# Patient Record
Sex: FEMALE | Race: WHITE | NOT HISPANIC OR LATINO | URBAN - METROPOLITAN AREA
[De-identification: names, ages, dates, MRNs, and addresses within clinical notes are randomized per-mention and may not be internally consistent; named-entity substitution may affect disease eponyms.]

---

## 2023-12-07 ENCOUNTER — OFFICE (OUTPATIENT)
Dept: URBAN - METROPOLITAN AREA CLINIC 29 | Facility: CLINIC | Age: 89
Setting detail: OPHTHALMOLOGY
End: 2023-12-07
Payer: MEDICARE

## 2023-12-07 DIAGNOSIS — H35.013: ICD-10-CM

## 2023-12-07 DIAGNOSIS — Z96.1: ICD-10-CM

## 2023-12-07 DIAGNOSIS — H40.003: ICD-10-CM

## 2023-12-07 PROBLEM — H35.40 PERIPAPILLARY ATROPHY: Status: ACTIVE | Noted: 2023-12-07

## 2023-12-07 PROCEDURE — 92250 FUNDUS PHOTOGRAPHY W/I&R: CPT | Performed by: OPHTHALMOLOGY

## 2023-12-07 PROCEDURE — 92014 COMPRE OPH EXAM EST PT 1/>: CPT | Performed by: OPHTHALMOLOGY

## 2023-12-07 ASSESSMENT — CONFRONTATIONAL VISUAL FIELD TEST (CVF)
OD_FINDINGS: FULL
OS_FINDINGS: FULL

## 2023-12-07 ASSESSMENT — REFRACTION_AUTOREFRACTION
OS_SPHERE: -1.00
OS_AXIS: 150
OD_SPHERE: -0.50
OS_CYLINDER: +1.00
OD_CYLINDER: +0.25
OD_AXIS: 090

## 2023-12-07 ASSESSMENT — REFRACTION_CURRENTRX
OD_OVR_VA: 20/
OD_SPHERE: +2.00
OS_SPHERE: +2.00
OS_OVR_VA: 20/

## 2023-12-07 ASSESSMENT — SPHEQUIV_DERIVED
OD_SPHEQUIV: -0.375
OS_SPHEQUIV: -0.5

## 2024-01-09 ENCOUNTER — TRANSCRIPTION ENCOUNTER (OUTPATIENT)
Age: 89
End: 2024-01-09

## 2024-01-22 ENCOUNTER — TRANSCRIPTION ENCOUNTER (OUTPATIENT)
Age: 89
End: 2024-01-22

## 2024-03-15 ENCOUNTER — APPOINTMENT (OUTPATIENT)
Dept: PAIN MANAGEMENT | Facility: CLINIC | Age: 89
End: 2024-03-15
Payer: MEDICARE

## 2024-03-15 VITALS — HEART RATE: 69 BPM | DIASTOLIC BLOOD PRESSURE: 76 MMHG | OXYGEN SATURATION: 97 % | SYSTOLIC BLOOD PRESSURE: 129 MMHG

## 2024-03-15 PROBLEM — Z00.00 ENCOUNTER FOR PREVENTIVE HEALTH EXAMINATION: Status: ACTIVE | Noted: 2024-03-15

## 2024-03-15 PROCEDURE — 99204 OFFICE O/P NEW MOD 45 MIN: CPT

## 2024-03-15 PROCEDURE — G2211 COMPLEX E/M VISIT ADD ON: CPT

## 2024-03-15 RX ORDER — PREGABALIN 50 MG/1
50 CAPSULE ORAL
Refills: 0 | Status: ACTIVE | COMMUNITY

## 2024-03-15 RX ORDER — LIDOCAINE AND PRILOCAINE 25; 25 MG/G; MG/G
2.5-2.5 CREAM TOPICAL
Qty: 1 | Refills: 2 | Status: ACTIVE | COMMUNITY
Start: 2024-03-15 | End: 1900-01-01

## 2024-03-15 RX ORDER — TRAMADOL HYDROCHLORIDE 25 MG/1
TABLET, COATED ORAL
Refills: 0 | Status: ACTIVE | COMMUNITY

## 2024-03-15 NOTE — HISTORY OF PRESENT ILLNESS
[FreeTextEntry1] : HPI  Ms. DIDI HAQ is a 89 year F with pmhx of T11 kyphoplasty (2015), shingles to right back (Dec- resolved), recent hospitalization for hyponatremia, chronic lower back pain. Pain to right flank where shingles were present. In addition, reports pain to lower back that radiates anteriorly to left flank. Requires 1/2 Tramadol prn when pain is severe. Pain is severe and impacting her quality of life. Denies any additional weakness, numbness, bowel/bladder dysfunction, history of bleeding disorders.   Previous and current pain medications/doses/effects: Lyrica 50mg qhs, 1/2 tab Tramadol   Previous Pain Treatments: PT/OT (Feb- March)  Previous Pain Injections: None  Previous Diagnostic Studies/Images:  12/29/23 CT THORACIC SPINE  ORDER #: [id number] CC: ; ; ; End of cc's  INTERPRETATION: PROCEDURE: CT thoracic spine without contrast.  INDICATION: Right back pain, history of thoracic kyphoplasty  TECHNIQUE: CT thoracic spine: Multiple axial sections were obtained from the cervicothoracic to the thoracolumbar junction with coronal and sagittal reformatted images.   COMPARISON: Thoracic spine radiographs 4/8/2015  FINDINGS:  There is a stable moderate T10 compression fracture. There is a T11 compression fracture cement material consistent with history of kyphoplasty. Otherwise the vertebral body heights are maintained. There is multilevel disc space narrowing with vacuum disc phenomenon at T9-T10. The prevertebral and paravertebral soft tissues are unremarkable. There is no high-grade spinal canal stenosis.  IMPRESSION:  No acute fracture or malalignment. Stable T10 and T11 compression fractures compared to radiographs 4/8/2015.  ====================================================================================== 12/31/23 CT LUMBAR SPINE  ORDER #: [id number] CC: ; ; ; End of cc's  .  CLINICAL INFORMATION: Lumbar spinal pain.  TECHNIQUE: Transaxial CT images of the lumbar spine was obtained without IV contrast. Axial, sagittal and coronal reformatted images were also reviewed.  COMPARISON: Prior CT study of the abdomen and pelvis dated 12/29/2023.  FINDINGS: Again seen is that the patient is status post vertebroplasty at the T11 level. A mild anterior wedge chronic compression deformity remains.  There are no acute fractures or dislocations. There is unchanged grade 1 anterolisthesis of L4 and S1 without pars defects. There is no loss of vertebral body height. No aggressive osteoblastic or osteolytic lesions are seen. Scattered degenerative lucencies and areas of sclerosis are seen throughout the vertebral bodies and facets. Degenerative disc disease is seen at every disc space level along with vacuum disc phenomenon. Lower lumbar facet arthropathy is appreciated. There is squaring and hypertrophy of the lumbar spinous processes which approximate each other. 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  Evaluation of the intraspinal contents is limited on CT modality. Variable degrees of multilevel canal and foraminal narrowing are noted secondary to bulging discs, listhesis, and other degenerative changes.  Bilateral SI joint arthropathy is notable.  Arterial vascular calcifications are seen. Previously noted uterine lipoleiomyoma appears unchanged.  IMPRESSION: No acute fractures or dislocations.  Multilevel lumbar spondylosis which appears similar.  [Back Pain] : back pain [10] : a maximum pain level of 10/10 [8] : an average pain level of 8/10 [Throbbing] : throbbing [Burning] : burning [Bending] : bending [Sitting] : sitting

## 2024-03-15 NOTE — ASSESSMENT
[FreeTextEntry1] : >> Imaging and Other Studies   back and Thoracic pain likely secondary to Thoracolumbar radiculopathy and discogenic pain refractory to conservative treatments including 6 consecutive weeks of home exercises/PT, will obtain MRI TS& LS to evaluate for pathology  may consider PT vs intervention pending eval   >> Therapy and Other Modalities   consider PT  >> Medications   continue lyrica  Regarding opiate medication to manage pain. I had a detailed discussion with the patient regarding the risks of long-term opioid use, including the potential for medication side effects, hyperaglesia, endocrine dysfunction,    acetaminophen 650mg q8h prn pain (caution <3g daily)  trial emla  trial capsaicin  >> Interventions   n/a  >> Consults   continue care with Dr. Austin  >> Discussion of Risks/Benefits/Alternatives    >Regarding any scheduled procedures:   In the event the patient is scheduled for a procedure,   I have discussed in detail with the patient that any interventional pain procedure is associated with potential risks.  The procedure may include an injection of steroids and potentially other medications (local anesthetic and normal saline) into the epidural space or surrounding tissue of the spine.  There are significant risks of this procedure which include and are not limited to infection, bleeding, worsening pain, dural puncture leading to postdural puncture headache, nerve damage, spinal cord injury, paralysis, stroke, and death.   There is a chance that the procedure does not improve their pain.   There are risks associated with the steroid being absorbed into the body systemically.  These include dysphoria, difficulty sleeping, mood swings and personality changes.  Premenopausal women may notice an irregularity in her menstrual cycle for 2-3 months following the injection.  Steroids can specifically affect patients with hypertension, diabetes, and peptic ulcers.  The procedure may cause a temporary increase in blood pressure and blood pressure, and may adversely affect a peptic ulcer.  Other, more rare complications, include avascular necrosis of joints, glaucoma and worsening of osteoporosis.   I have discussed the risks of the procedure at length with the patient, and the potential benefits of pain relief.  I have offered alternatives to the procedure.  All questions were answered.   The patient expressed understanding and wishes to proceed with the procedure.   > Longitudinal management of Complex Painful condition   The patient is being managed for a complex condition that requires ongoing management.  The nature of this condition demands nuanced approach to treatment.  The seriousness of the condition necessitates an in-depth and focused approach to management and coordination with other healthcare professionals.    This visit involves intricate evaluation and management of the patient's condition.  The complexity of the visit was due to the need for detailed assessment of the current state, consideration of potential complications and a careful balancing of treatment options to management the chronic condition effectively.   As detailed above, the patient has a chronic significant painful condition that requires regular and detailed management.  The condition's impact on the patient's quality of life and health is substantial and necessitates a comprehensive and tailored approach   >> Conclusion   There were no barriers to communication. Informed patient that I would be available for any additional questions. Patient was instructed to call with any worsening symptoms including severe pain, new numbness/weakness, or changes in the bowel/bladder function. Discussed role of nsaids in pain management and all relevant risks, if patient is continuing to require after 4 weeks the patient should f/u for alternative treatment. Instructed patient to maintain pain diary to monitor pain level, mobility, and function.

## 2024-03-21 ENCOUNTER — RESULT REVIEW (OUTPATIENT)
Age: 89
End: 2024-03-21

## 2024-03-22 ENCOUNTER — APPOINTMENT (OUTPATIENT)
Dept: PAIN MANAGEMENT | Facility: CLINIC | Age: 89
End: 2024-03-22
Payer: MEDICARE

## 2024-03-22 VITALS
BODY MASS INDEX: 31.49 KG/M2 | WEIGHT: 150 LBS | SYSTOLIC BLOOD PRESSURE: 120 MMHG | HEIGHT: 58 IN | DIASTOLIC BLOOD PRESSURE: 60 MMHG

## 2024-03-22 PROCEDURE — G2211 COMPLEX E/M VISIT ADD ON: CPT

## 2024-03-22 PROCEDURE — 99214 OFFICE O/P EST MOD 30 MIN: CPT

## 2024-03-22 NOTE — PHYSICAL EXAM
[Normal muscle bulk without asymmetry] : normal muscle bulk without asymmetry [Spine: Flexion to ___ degrees, without pain] : spine: flexion to [unfilled] degrees, without pain [Facet Tenderness] : facet tenderness [] : Motor: [Normal] : Normal affect

## 2024-04-10 ENCOUNTER — APPOINTMENT (OUTPATIENT)
Dept: PAIN MANAGEMENT | Facility: CLINIC | Age: 89
End: 2024-04-10
Payer: MEDICARE

## 2024-04-10 VITALS
HEART RATE: 80 BPM | RESPIRATION RATE: 16 BRPM | SYSTOLIC BLOOD PRESSURE: 120 MMHG | OXYGEN SATURATION: 98 % | DIASTOLIC BLOOD PRESSURE: 76 MMHG

## 2024-04-10 PROCEDURE — 64494 INJ PARAVERT F JNT L/S 2 LEV: CPT | Mod: RT

## 2024-04-10 PROCEDURE — 64493 INJ PARAVERT F JNT L/S 1 LEV: CPT | Mod: RT

## 2024-04-10 RX ADMIN — Medication 0 %: at 00:00

## 2024-04-10 RX ADMIN — TRIAMCINOLONE ACETONIDE 0 MG/ML: 10 INJECTION, SUSPENSION INTRA-ARTICULAR; INTRALESIONAL at 00:00

## 2024-04-10 RX ADMIN — LIDOCAINE HYDROCHLORIDE %: 10 INJECTION, SOLUTION INFILTRATION; PERINEURAL at 00:00

## 2024-04-10 NOTE — HISTORY OF PRESENT ILLNESS
[Back Pain] : back pain [10] : a maximum pain level of 10/10 [8] : an average pain level of 8/10 [Throbbing] : throbbing [Burning] : burning [Bending] : bending [Sitting] : sitting [FreeTextEntry1] : Interval Note:  Since last visit the pain is not improved.  Continues to have pain over right flank.  Pain is so bad that patient finds it difficult to perform adls and ambulate. Denies any additional weakness, numbness, bowel/bladder dysfunction.    HPI  Ms. DIDI HAQ is a 89 year F with pmhx of T11 kyphoplasty (2015), shingles to right back (Dec- resolved), recent hospitalization for hyponatremia, chronic lower back pain. Pain to right flank where shingles were present. In addition, reports pain to lower back that radiates anteriorly to left flank. Requires 1/2 Tramadol prn when pain is severe. Pain is severe and impacting her quality of life. Denies any additional weakness, numbness, bowel/bladder dysfunction, history of bleeding disorders.   Previous and current pain medications/doses/effects: Lyrica 50mg qhs, 1/2 tab Tramadol   Previous Pain Treatments: PT/OT (Feb- March)  Previous Pain Injections: None  Previous Diagnostic Studies/Images:   MRI TS LS 3/21/24  Thoracic CT dated 12/29/2023 and lumbar CT dated 12/31/2023. No prior MRI available.   FINDINGS:   OSSEOUS: Mild exaggeration the normal thoracic kyphosis with chronic healed mild anterior wedge compression fracture of the inferior endplate of T10. Unchanged mild anterior wedge compression fracture of the inferior endplate of T11, status post kyphoplasty with nonspecific mild patchy surrounding osteitis.   Mixed Modic lumbar endplate changes. Grade 1 anterolisthesis at L5-S1 without pars interarticularis defects. Grade 1 retrolisthesis at L1-L2.   THORACIC SPINE: Spinal canal and bilateral neural canals are adequate throughout.   LUMBAR SPINE:   T12-L1: Annular disc osteophyte complex contributes to mild spinal canal stenosis and mild left- sided neural canal stenosis. Right neural canal is adequate.   L1-L2: Broad-based posterior disc osteophyte complex contribute to mild to moderate spinal canal stenosis with right side slightly worse than left partial effacement of lateral recesses and mild bilateral neural canal stenosis.   L2-L3: Broad-based posterior disc osteophyte complex contributes to mild spinal canal stenosis and mild bilateral neural canal stenosis.   L3-L4: Broad-based posterior disc osteophyte complex contributes to mild spinal canal stenosis and mild bilateral neural canal stenosis.   L4-L5: Broad-based posterior disc osteophyte complex and mild to moderate facet arthrosis contribute to mild spinal canal stenosis with partial effacement of the right lateral recess and mild to moderate bilateral neural canal stenosis.   L5-S1: Broad-based posterior disc signal bulge and severe facet arthrosis. Spinal canal and bilateral neural canals are adequate.   GENERAL: Conus medullaris tip is anatomic in positioning. No intradural or extradural lesion.   IMPRESSION:  1.  No high-grade thoracolumbar spinal canal or neural canal stenosis.  2.  Unchanged chronic mild anterior wedge compression fractures of the inferior endplates of T10 and T11, status post kyphoplasty at T11.  3.  Overall moderate discogenic lumbar spondylosis.  4.  At L5-S1 degenerative grade 1 anterolisthesis and severe bilateral facet arthrosis.  12/29/23 CT THORACIC SPINE  ORDER #: [id number] CC: ; ; ; End of cc's  INTERPRETATION: PROCEDURE: CT thoracic spine without contrast.  INDICATION: Right back pain, history of thoracic kyphoplasty  TECHNIQUE: CT thoracic spine: Multiple axial sections were obtained from the cervicothoracic to the thoracolumbar junction with coronal and sagittal reformatted images.   COMPARISON: Thoracic spine radiographs 4/8/2015  FINDINGS:  There is a stable moderate T10 compression fracture. There is a T11 compression fracture cement material consistent with history of kyphoplasty. Otherwise the vertebral body heights are maintained. There is multilevel disc space narrowing with vacuum disc phenomenon at T9-T10. The prevertebral and paravertebral soft tissues are unremarkable. There is no high-grade spinal canal stenosis.  IMPRESSION:  No acute fracture or malalignment. Stable T10 and T11 compression fractures compared to radiographs 4/8/2015.  ====================================================================================== 12/31/23 CT LUMBAR SPINE  ORDER #: [id number] CC: ; ; ; End of cc's  .  CLINICAL INFORMATION: Lumbar spinal pain.  TECHNIQUE: Transaxial CT images of the lumbar spine was obtained without IV contrast. Axial, sagittal and coronal reformatted images were also reviewed.  COMPARISON: Prior CT study of the abdomen and pelvis dated 12/29/2023.  FINDINGS: Again seen is that the patient is status post vertebroplasty at the T11 level. A mild anterior wedge chronic compression deformity remains.  There are no acute fractures or dislocations. There is unchanged grade 1 anterolisthesis of L4 and S1 without pars defects. There is no loss of vertebral body height. No aggressive osteoblastic or osteolytic lesions are seen. Scattered degenerative lucencies and areas of sclerosis are seen throughout the vertebral bodies and facets. Degenerative disc disease is seen at every disc space level along with vacuum disc phenomenon. Lower lumbar facet arthropathy is appreciated. There is squaring and hypertrophy of the lumbar spinous processes which approximate each other. 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  Evaluation of the intraspinal contents is limited on CT modality. Variable degrees of multilevel canal and foraminal narrowing are noted secondary to bulging discs, listhesis, and other degenerative changes.  Bilateral SI joint arthropathy is notable.  Arterial vascular calcifications are seen. Previously noted uterine lipoleiomyoma appears unchanged.  IMPRESSION: No acute fractures or dislocations.  Multilevel lumbar spondylosis which appears similar.

## 2024-04-10 NOTE — ASSESSMENT
[FreeTextEntry1] : >> Imaging and Other Studies   I personally reviewed the relevant imaging.  Discussed and explained to patient the likely source of pathology and pain.  Questions answered. MRI    >> Therapy and Other Modalities   consider PT  >> Medications   continue lyrica  Regarding opiate medication to manage pain. I had a detailed discussion with the patient regarding the risks of long-term opioid use, including the potential for medication side effects, hyperaglesia, endocrine dysfunction,    acetaminophen 650mg q8h prn pain (caution <3g daily)  trial emla  trial capsaicin  >> Interventions   Significant component of axial back pain secondary to lumbar spondylosis and facet arthropathy demonstrated on MRI LS.  Pain refractory to conservative treatments.  Will schedule RIGHT lumbar 3, 4, 5 medial branch block (2 joints, 3 nerves on each side) r/b/a discussed  May consider radiofreqency ablation  >> Consults   continue care with Dr. Austin  >> Discussion of Risks/Benefits/Alternatives    >Regarding any scheduled procedures:   In the event the patient is scheduled for a procedure,   I have discussed in detail with the patient that any interventional pain procedure is associated with potential risks.  The procedure may include an injection of steroids and potentially other medications (local anesthetic and normal saline) into the epidural space or surrounding tissue of the spine.  There are significant risks of this procedure which include and are not limited to infection, bleeding, worsening pain, dural puncture leading to postdural puncture headache, nerve damage, spinal cord injury, paralysis, stroke, and death.   There is a chance that the procedure does not improve their pain.   There are risks associated with the steroid being absorbed into the body systemically.  These include dysphoria, difficulty sleeping, mood swings and personality changes.  Premenopausal women may notice an irregularity in her menstrual cycle for 2-3 months following the injection.  Steroids can specifically affect patients with hypertension, diabetes, and peptic ulcers.  The procedure may cause a temporary increase in blood pressure and blood pressure, and may adversely affect a peptic ulcer.  Other, more rare complications, include avascular necrosis of joints, glaucoma and worsening of osteoporosis.   I have discussed the risks of the procedure at length with the patient, and the potential benefits of pain relief.  I have offered alternatives to the procedure.  All questions were answered.   The patient expressed understanding and wishes to proceed with the procedure.   > Longitudinal management of Complex Painful condition   The patient is being managed for a complex condition that requires ongoing management.  The nature of this condition demands nuanced approach to treatment.  The seriousness of the condition necessitates an in-depth and focused approach to management and coordination with other healthcare professionals.    This visit involves intricate evaluation and management of the patient's condition.  The complexity of the visit was due to the need for detailed assessment of the current state, consideration of potential complications and a careful balancing of treatment options to management the chronic condition effectively.   As detailed above, the patient has a chronic significant painful condition that requires regular and detailed management.  The condition's impact on the patient's quality of life and health is substantial and necessitates a comprehensive and tailored approach   >> Conclusion   There were no barriers to communication. Informed patient that I would be available for any additional questions. Patient was instructed to call with any worsening symptoms including severe pain, new numbness/weakness, or changes in the bowel/bladder function. Discussed role of nsaids in pain management and all relevant risks, if patient is continuing to require after 4 weeks the patient should f/u for alternative treatment. Instructed patient to maintain pain diary to monitor pain level, mobility, and function.

## 2024-04-10 NOTE — PROCEDURE
[FreeTextEntry1] : LUMBAR MEDIAL BRANCH BLOCK RIGHT  The patient was placed in the prone position on the fluoroscopic table with a pillow under the abdomen.  Routine monitors were applied.  The back was draped in the usual sterile fashion and sterile technique was adhered to throughout the entire procedure.  The [RIGHT ] L3, 4, 5 levels were identified under fluoroscopic guidance.  The vertebral body end plates were aligned and the junction of the superior articular process and the base of the transverse process was brought into view using an oblique angulation of the C-arm.  The skin and subcutaneous tissues were infiltrated with 1% Lidocaine.  At all the levels except for the lumbosacral junction, a 25-gauge 3.5" spinal needle was inserted at the angle between the superior articular process and the base of the transverse process (after local anesthesia).  Oblique angulation was used to guide the needle into position.  1cc of 0.75% Bupivacaine with 1mg kenalog was injected at each level.    The patient tolerated the procedure well.  There was no neurological deficit post procedure.  The patient went to recovery room in stable condition.  Discharge instructions were given to the patient.

## 2024-04-11 ENCOUNTER — NON-APPOINTMENT (OUTPATIENT)
Age: 89
End: 2024-04-11

## 2024-04-12 ENCOUNTER — APPOINTMENT (OUTPATIENT)
Dept: PAIN MANAGEMENT | Facility: CLINIC | Age: 89
End: 2024-04-12
Payer: MEDICARE

## 2024-04-12 VITALS
WEIGHT: 150 LBS | HEIGHT: 58 IN | SYSTOLIC BLOOD PRESSURE: 153 MMHG | BODY MASS INDEX: 31.49 KG/M2 | DIASTOLIC BLOOD PRESSURE: 82 MMHG

## 2024-04-12 PROCEDURE — G2211 COMPLEX E/M VISIT ADD ON: CPT

## 2024-04-12 PROCEDURE — 99214 OFFICE O/P EST MOD 30 MIN: CPT

## 2024-04-12 NOTE — HISTORY OF PRESENT ILLNESS
[Back Pain] : back pain [8] : an average pain level of 8/10 [10] : a maximum pain level of 10/10 [Throbbing] : throbbing [Burning] : burning [Sitting] : sitting [Bending] : bending [FreeTextEntry1] : Interval Note: sp RIGHT lumbar 3, 4, 5 medial branch block (2 joints, 3 nerves on each side) without significant improvement in right back pain Since last visit the pain is not improved.  Continues to have pain over right flank. reports that right flank pain radiates to right groin as well.  Pain is so bad that patient finds it difficult to perform adls and ambulate. Denies any additional weakness, numbness, bowel/bladder dysfunction.    HPI  Ms. DIDI HAQ is a 89 year F with pmhx of T11 kyphoplasty (2015), shingles to right back (Dec- resolved), recent hospitalization for hyponatremia, chronic lower back pain. Pain to right flank where shingles were present. In addition, reports pain to lower back that radiates anteriorly to left flank. Requires 1/2 Tramadol prn when pain is severe. Pain is severe and impacting her quality of life. Denies any additional weakness, numbness, bowel/bladder dysfunction, history of bleeding disorders.   Previous and current pain medications/doses/effects: Lyrica 50mg qhs, 1/2 tab Tramadol   Previous Pain Treatments: PT/OT (Feb- March)  Previous Pain Injections:   RIGHT lumbar 3, 4, 5 medial branch block (2 joints, 3 nerves on each side) 4/10/24  Previous Diagnostic Studies/Images:   MRI TS LS 3/21/24  Thoracic CT dated 12/29/2023 and lumbar CT dated 12/31/2023. No prior MRI available.   FINDINGS:   OSSEOUS: Mild exaggeration the normal thoracic kyphosis with chronic healed mild anterior wedge compression fracture of the inferior endplate of T10. Unchanged mild anterior wedge compression fracture of the inferior endplate of T11, status post kyphoplasty with nonspecific mild patchy surrounding osteitis.   Mixed Modic lumbar endplate changes. Grade 1 anterolisthesis at L5-S1 without pars interarticularis defects. Grade 1 retrolisthesis at L1-L2.   THORACIC SPINE: Spinal canal and bilateral neural canals are adequate throughout.   LUMBAR SPINE:   T12-L1: Annular disc osteophyte complex contributes to mild spinal canal stenosis and mild left- sided neural canal stenosis. Right neural canal is adequate.   L1-L2: Broad-based posterior disc osteophyte complex contribute to mild to moderate spinal canal stenosis with right side slightly worse than left partial effacement of lateral recesses and mild bilateral neural canal stenosis.   L2-L3: Broad-based posterior disc osteophyte complex contributes to mild spinal canal stenosis and mild bilateral neural canal stenosis.   L3-L4: Broad-based posterior disc osteophyte complex contributes to mild spinal canal stenosis and mild bilateral neural canal stenosis.   L4-L5: Broad-based posterior disc osteophyte complex and mild to moderate facet arthrosis contribute to mild spinal canal stenosis with partial effacement of the right lateral recess and mild to moderate bilateral neural canal stenosis.   L5-S1: Broad-based posterior disc signal bulge and severe facet arthrosis. Spinal canal and bilateral neural canals are adequate.   GENERAL: Conus medullaris tip is anatomic in positioning. No intradural or extradural lesion.   IMPRESSION:  1.  No high-grade thoracolumbar spinal canal or neural canal stenosis.  2.  Unchanged chronic mild anterior wedge compression fractures of the inferior endplates of T10 and T11, status post kyphoplasty at T11.  3.  Overall moderate discogenic lumbar spondylosis.  4.  At L5-S1 degenerative grade 1 anterolisthesis and severe bilateral facet arthrosis.  12/29/23 CT THORACIC SPINE  ORDER #: [id number] CC: ; ; ; End of cc's  INTERPRETATION: PROCEDURE: CT thoracic spine without contrast.  INDICATION: Right back pain, history of thoracic kyphoplasty  TECHNIQUE: CT thoracic spine: Multiple axial sections were obtained from the cervicothoracic to the thoracolumbar junction with coronal and sagittal reformatted images.   COMPARISON: Thoracic spine radiographs 4/8/2015  FINDINGS:  There is a stable moderate T10 compression fracture. There is a T11 compression fracture cement material consistent with history of kyphoplasty. Otherwise the vertebral body heights are maintained. There is multilevel disc space narrowing with vacuum disc phenomenon at T9-T10. The prevertebral and paravertebral soft tissues are unremarkable. There is no high-grade spinal canal stenosis.  IMPRESSION:  No acute fracture or malalignment. Stable T10 and T11 compression fractures compared to radiographs 4/8/2015.  ====================================================================================== 12/31/23 CT LUMBAR SPINE  ORDER #: [id number] CC: ; ; ; End of cc's  .  CLINICAL INFORMATION: Lumbar spinal pain.  TECHNIQUE: Transaxial CT images of the lumbar spine was obtained without IV contrast. Axial, sagittal and coronal reformatted images were also reviewed.  COMPARISON: Prior CT study of the abdomen and pelvis dated 12/29/2023.  FINDINGS: Again seen is that the patient is status post vertebroplasty at the T11 level. A mild anterior wedge chronic compression deformity remains.  There are no acute fractures or dislocations. There is unchanged grade 1 anterolisthesis of L4 and S1 without pars defects. There is no loss of vertebral body height. No aggressive osteoblastic or osteolytic lesions are seen. Scattered degenerative lucencies and areas of sclerosis are seen throughout the vertebral bodies and facets. Degenerative disc disease is seen at every disc space level along with vacuum disc phenomenon. Lower lumbar facet arthropathy is appreciated. There is squaring and hypertrophy of the lumbar spinous processes which approximate each other. 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  Evaluation of the intraspinal contents is limited on CT modality. Variable degrees of multilevel canal and foraminal narrowing are noted secondary to bulging discs, listhesis, and other degenerative changes.  Bilateral SI joint arthropathy is notable.  Arterial vascular calcifications are seen. Previously noted uterine lipoleiomyoma appears unchanged.  IMPRESSION: No acute fractures or dislocations.  Multilevel lumbar spondylosis which appears similar.

## 2024-04-12 NOTE — ASSESSMENT
[FreeTextEntry1] : >> Imaging and Other Studies   I personally reviewed the relevant imaging.  Discussed and explained to patient the likely source of pathology and pain.  Questions answered. MRI    >> Therapy and Other Modalities   consider PT  >> Medications   continue lyrica  Regarding opiate medication to manage pain. I had a detailed discussion with the patient regarding the risks of long-term opioid use, including the potential for medication side effects, hyperaglesia, endocrine dysfunction,    acetaminophen 650mg q8h prn pain (caution <3g daily)  trial emla  trial capsaicin  >> Interventions   Significant component of axial back pain secondary to lumbar spondylosis and facet arthropathy demonstrated on MRI LS.  Pain refractory to conservative treatments.  sp RIGHT lumbar 3, 4, 5 medial branch block (2 joints, 3 nerves on each side) r/b/a discussed  Significant component of back and leg pain likely secondary to lumbar spinal stenosis demonstrated on MRI LS.  Will schedule L2-3 interlaminar epidural steroid injection r/b/a discussed    >> Consults   continue care with Dr. Austin  >> Discussion of Risks/Benefits/Alternatives    >Regarding any scheduled procedures:   In the event the patient is scheduled for a procedure,   I have discussed in detail with the patient that any interventional pain procedure is associated with potential risks.  The procedure may include an injection of steroids and potentially other medications (local anesthetic and normal saline) into the epidural space or surrounding tissue of the spine.  There are significant risks of this procedure which include and are not limited to infection, bleeding, worsening pain, dural puncture leading to postdural puncture headache, nerve damage, spinal cord injury, paralysis, stroke, and death.   There is a chance that the procedure does not improve their pain.   There are risks associated with the steroid being absorbed into the body systemically.  These include dysphoria, difficulty sleeping, mood swings and personality changes.  Premenopausal women may notice an irregularity in her menstrual cycle for 2-3 months following the injection.  Steroids can specifically affect patients with hypertension, diabetes, and peptic ulcers.  The procedure may cause a temporary increase in blood pressure and blood pressure, and may adversely affect a peptic ulcer.  Other, more rare complications, include avascular necrosis of joints, glaucoma and worsening of osteoporosis.   I have discussed the risks of the procedure at length with the patient, and the potential benefits of pain relief.  I have offered alternatives to the procedure.  All questions were answered.   The patient expressed understanding and wishes to proceed with the procedure.   > Longitudinal management of Complex Painful condition   The patient is being managed for a complex condition that requires ongoing management.  The nature of this condition demands nuanced approach to treatment.  The seriousness of the condition necessitates an in-depth and focused approach to management and coordination with other healthcare professionals.    This visit involves intricate evaluation and management of the patient's condition.  The complexity of the visit was due to the need for detailed assessment of the current state, consideration of potential complications and a careful balancing of treatment options to management the chronic condition effectively.   As detailed above, the patient has a chronic significant painful condition that requires regular and detailed management.  The condition's impact on the patient's quality of life and health is substantial and necessitates a comprehensive and tailored approach   >> Conclusion   There were no barriers to communication. Informed patient that I would be available for any additional questions. Patient was instructed to call with any worsening symptoms including severe pain, new numbness/weakness, or changes in the bowel/bladder function. Discussed role of nsaids in pain management and all relevant risks, if patient is continuing to require after 4 weeks the patient should f/u for alternative treatment. Instructed patient to maintain pain diary to monitor pain level, mobility, and function.

## 2024-04-12 NOTE — PHYSICAL EXAM
[Normal muscle bulk without asymmetry] : normal muscle bulk without asymmetry [Antalgic] : antalgic [] : Motor: [Normal] : Normal affect

## 2024-05-01 ENCOUNTER — APPOINTMENT (OUTPATIENT)
Dept: PAIN MANAGEMENT | Facility: CLINIC | Age: 89
End: 2024-05-01
Payer: MEDICARE

## 2024-05-01 VITALS
SYSTOLIC BLOOD PRESSURE: 136 MMHG | DIASTOLIC BLOOD PRESSURE: 81 MMHG | RESPIRATION RATE: 16 BRPM | OXYGEN SATURATION: 98 % | HEART RATE: 83 BPM

## 2024-05-01 PROCEDURE — 62323 NJX INTERLAMINAR LMBR/SAC: CPT

## 2024-05-01 RX ADMIN — LIDOCAINE HYDROCHLORIDE %: 10 INJECTION, SOLUTION INFILTRATION; PERINEURAL at 00:00

## 2024-05-01 RX ADMIN — TRIAMCINOLONE ACETONIDE 0 MG/ML: 80 INJECTION, SUSPENSION INTRA-ARTICULAR; INTRAMUSCULAR at 00:00

## 2024-05-01 RX ADMIN — IOHEXOL 0 MG/ML: 180 INJECTION INTRAVENOUS at 00:00

## 2024-05-21 ENCOUNTER — APPOINTMENT (OUTPATIENT)
Dept: PAIN MANAGEMENT | Facility: CLINIC | Age: 89
End: 2024-05-21
Payer: MEDICARE

## 2024-05-21 VITALS
OXYGEN SATURATION: 97 % | SYSTOLIC BLOOD PRESSURE: 122 MMHG | BODY MASS INDEX: 31.49 KG/M2 | HEIGHT: 58 IN | WEIGHT: 150 LBS | DIASTOLIC BLOOD PRESSURE: 77 MMHG | HEART RATE: 95 BPM

## 2024-05-21 DIAGNOSIS — B02.29 OTHER POSTHERPETIC NERVOUS SYSTEM INVOLVEMENT: ICD-10-CM

## 2024-05-21 DIAGNOSIS — M48.061 SPINAL STENOSIS, LUMBAR REGION WITHOUT NEUROGENIC CLAUDICATION: ICD-10-CM

## 2024-05-21 DIAGNOSIS — M47.24 OTHER SPONDYLOSIS WITH RADICULOPATHY, THORACIC REGION: ICD-10-CM

## 2024-05-21 DIAGNOSIS — M47.814 SPONDYLOSIS W/OUT MYELOPATHY OR RADICULOPATHY, THORACIC REGION: ICD-10-CM

## 2024-05-21 DIAGNOSIS — M47.817 SPONDYLOSIS W/OUT MYELOPATHY OR RADICULOPATHY, LUMBOSACRAL REGION: ICD-10-CM

## 2024-05-21 DIAGNOSIS — G89.4 CHRONIC PAIN SYNDROME: ICD-10-CM

## 2024-05-21 DIAGNOSIS — M54.16 RADICULOPATHY, LUMBAR REGION: ICD-10-CM

## 2024-05-21 PROCEDURE — 99214 OFFICE O/P EST MOD 30 MIN: CPT

## 2024-05-21 PROCEDURE — G2211 COMPLEX E/M VISIT ADD ON: CPT

## 2024-05-21 NOTE — HISTORY OF PRESENT ILLNESS
[Back Pain] : back pain [8] : an average pain level of 8/10 [10] : a maximum pain level of 10/10 [Throbbing] : throbbing [Burning] : burning [Sitting] : sitting [Bending] : bending [FreeTextEntry1] : Interval Note:  sp  L2-3 interlaminar epidural steroid injection with significant improvement in back pain. patient reports that her back pain is much improved. Able to perform adls. Denies any additional weakness, numbness, bowel/bladder dysfunction.    HPI  Ms. DIDI HAQ is a 89 year F with pmhx of T11 kyphoplasty (2015), shingles to right back (Dec- resolved), recent hospitalization for hyponatremia, chronic lower back pain. Pain to right flank where shingles were present. In addition, reports pain to lower back that radiates anteriorly to left flank. Requires 1/2 Tramadol prn when pain is severe. Pain is severe and impacting her quality of life. Denies any additional weakness, numbness, bowel/bladder dysfunction, history of bleeding disorders.   Previous and current pain medications/doses/effects: Lyrica 50mg qhs, 1/2 tab Tramadol   Previous Pain Treatments: PT/OT (Feb- March)  Previous Pain Injections:   L2-3 interlaminar epidural steroid injection 5/1/24 RIGHT lumbar 3, 4, 5 medial branch block (2 joints, 3 nerves on each side) 4/10/24  Previous Diagnostic Studies/Images:   MRI TS LS 3/21/24  Thoracic CT dated 12/29/2023 and lumbar CT dated 12/31/2023. No prior MRI available.   FINDINGS:   OSSEOUS: Mild exaggeration the normal thoracic kyphosis with chronic healed mild anterior wedge compression fracture of the inferior endplate of T10. Unchanged mild anterior wedge compression fracture of the inferior endplate of T11, status post kyphoplasty with nonspecific mild patchy surrounding osteitis.   Mixed Modic lumbar endplate changes. Grade 1 anterolisthesis at L5-S1 without pars interarticularis defects. Grade 1 retrolisthesis at L1-L2.   THORACIC SPINE: Spinal canal and bilateral neural canals are adequate throughout.   LUMBAR SPINE:   T12-L1: Annular disc osteophyte complex contributes to mild spinal canal stenosis and mild left- sided neural canal stenosis. Right neural canal is adequate.   L1-L2: Broad-based posterior disc osteophyte complex contribute to mild to moderate spinal canal stenosis with right side slightly worse than left partial effacement of lateral recesses and mild bilateral neural canal stenosis.   L2-L3: Broad-based posterior disc osteophyte complex contributes to mild spinal canal stenosis and mild bilateral neural canal stenosis.   L3-L4: Broad-based posterior disc osteophyte complex contributes to mild spinal canal stenosis and mild bilateral neural canal stenosis.   L4-L5: Broad-based posterior disc osteophyte complex and mild to moderate facet arthrosis contribute to mild spinal canal stenosis with partial effacement of the right lateral recess and mild to moderate bilateral neural canal stenosis.   L5-S1: Broad-based posterior disc signal bulge and severe facet arthrosis. Spinal canal and bilateral neural canals are adequate.   GENERAL: Conus medullaris tip is anatomic in positioning. No intradural or extradural lesion.   IMPRESSION:  1.  No high-grade thoracolumbar spinal canal or neural canal stenosis.  2.  Unchanged chronic mild anterior wedge compression fractures of the inferior endplates of T10 and T11, status post kyphoplasty at T11.  3.  Overall moderate discogenic lumbar spondylosis.  4.  At L5-S1 degenerative grade 1 anterolisthesis and severe bilateral facet arthrosis.  12/29/23 CT THORACIC SPINE  ORDER #: [id number] CC: ; ; ; End of cc's  INTERPRETATION: PROCEDURE: CT thoracic spine without contrast.  INDICATION: Right back pain, history of thoracic kyphoplasty  TECHNIQUE: CT thoracic spine: Multiple axial sections were obtained from the cervicothoracic to the thoracolumbar junction with coronal and sagittal reformatted images.   COMPARISON: Thoracic spine radiographs 4/8/2015  FINDINGS:  There is a stable moderate T10 compression fracture. There is a T11 compression fracture cement material consistent with history of kyphoplasty. Otherwise the vertebral body heights are maintained. There is multilevel disc space narrowing with vacuum disc phenomenon at T9-T10. The prevertebral and paravertebral soft tissues are unremarkable. There is no high-grade spinal canal stenosis.  IMPRESSION:  No acute fracture or malalignment. Stable T10 and T11 compression fractures compared to radiographs 4/8/2015.  ====================================================================================== 12/31/23 CT LUMBAR SPINE  ORDER #: [id number] CC: ; ; ; End of cc's  .  CLINICAL INFORMATION: Lumbar spinal pain.  TECHNIQUE: Transaxial CT images of the lumbar spine was obtained without IV contrast. Axial, sagittal and coronal reformatted images were also reviewed.  COMPARISON: Prior CT study of the abdomen and pelvis dated 12/29/2023.  FINDINGS: Again seen is that the patient is status post vertebroplasty at the T11 level. A mild anterior wedge chronic compression deformity remains.  There are no acute fractures or dislocations. There is unchanged grade 1 anterolisthesis of L4 and S1 without pars defects. There is no loss of vertebral body height. No aggressive osteoblastic or osteolytic lesions are seen. Scattered degenerative lucencies and areas of sclerosis are seen throughout the vertebral bodies and facets. Degenerative disc disease is seen at every disc space level along with vacuum disc phenomenon. Lower lumbar facet arthropathy is appreciated. There is squaring and hypertrophy of the lumbar spinous processes which approximate each other. 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  Evaluation of the intraspinal contents is limited on CT modality. Variable degrees of multilevel canal and foraminal narrowing are noted secondary to bulging discs, listhesis, and other degenerative changes.  Bilateral SI joint arthropathy is notable.  Arterial vascular calcifications are seen. Previously noted uterine lipoleiomyoma appears unchanged.  IMPRESSION: No acute fractures or dislocations.  Multilevel lumbar spondylosis which appears similar.

## 2024-05-21 NOTE — ASSESSMENT
[FreeTextEntry1] : >> Imaging and Other Studies   I personally reviewed the relevant imaging.  Discussed and explained to patient the likely source of pathology and pain.  Questions answered. MRI    >> Therapy and Other Modalities   start PT  >> Medications   continue lyrica  Regarding opiate medication to manage pain. I had a detailed discussion with the patient regarding the risks of long-term opioid use, including the potential for medication side effects, hyperaglesia, endocrine dysfunction,    acetaminophen 650mg q8h prn pain (caution <3g daily)  trial emla  trial capsaicin  >> Interventions   Significant component of axial back pain secondary to lumbar spondylosis and facet arthropathy demonstrated on MRI LS.  Pain refractory to conservative treatments.  sp RIGHT lumbar 3, 4, 5 medial branch block (2 joints, 3 nerves on each side) r/b/a discussed  Significant component of back and leg pain likely secondary to lumbar spinal stenosis demonstrated on MRI LS.  sp L2-3 interlaminar epidural steroid injection with significant improvement    >> Consults   continue care with Dr. Austin  >> Discussion of Risks/Benefits/Alternatives    >Regarding any scheduled procedures:   In the event the patient is scheduled for a procedure,   I have discussed in detail with the patient that any interventional pain procedure is associated with potential risks.  The procedure may include an injection of steroids and potentially other medications (local anesthetic and normal saline) into the epidural space or surrounding tissue of the spine.  There are significant risks of this procedure which include and are not limited to infection, bleeding, worsening pain, dural puncture leading to postdural puncture headache, nerve damage, spinal cord injury, paralysis, stroke, and death.   There is a chance that the procedure does not improve their pain.   There are risks associated with the steroid being absorbed into the body systemically.  These include dysphoria, difficulty sleeping, mood swings and personality changes.  Premenopausal women may notice an irregularity in her menstrual cycle for 2-3 months following the injection.  Steroids can specifically affect patients with hypertension, diabetes, and peptic ulcers.  The procedure may cause a temporary increase in blood pressure and blood pressure, and may adversely affect a peptic ulcer.  Other, more rare complications, include avascular necrosis of joints, glaucoma and worsening of osteoporosis.   I have discussed the risks of the procedure at length with the patient, and the potential benefits of pain relief.  I have offered alternatives to the procedure.  All questions were answered.   The patient expressed understanding and wishes to proceed with the procedure.   > Longitudinal management of Complex Painful condition   The patient is being managed for a complex condition that requires ongoing management.  The nature of this condition demands nuanced approach to treatment.  The seriousness of the condition necessitates an in-depth and focused approach to management and coordination with other healthcare professionals.    This visit involves intricate evaluation and management of the patient's condition.  The complexity of the visit was due to the need for detailed assessment of the current state, consideration of potential complications and a careful balancing of treatment options to management the chronic condition effectively.   As detailed above, the patient has a chronic significant painful condition that requires regular and detailed management.  The condition's impact on the patient's quality of life and health is substantial and necessitates a comprehensive and tailored approach   >> Conclusion   There were no barriers to communication. Informed patient that I would be available for any additional questions. Patient was instructed to call with any worsening symptoms including severe pain, new numbness/weakness, or changes in the bowel/bladder function. Discussed role of nsaids in pain management and all relevant risks, if patient is continuing to require after 4 weeks the patient should f/u for alternative treatment. Instructed patient to maintain pain diary to monitor pain level, mobility, and function.

## 2024-06-07 ENCOUNTER — APPOINTMENT (OUTPATIENT)
Dept: PAIN MANAGEMENT | Facility: CLINIC | Age: 89
End: 2024-06-07

## 2024-12-19 ENCOUNTER — OFFICE (OUTPATIENT)
Dept: URBAN - METROPOLITAN AREA CLINIC 29 | Facility: CLINIC | Age: 89
Setting detail: OPHTHALMOLOGY
End: 2024-12-19
Payer: MEDICARE

## 2024-12-19 DIAGNOSIS — H40.003: ICD-10-CM

## 2024-12-19 DIAGNOSIS — H35.40: ICD-10-CM

## 2024-12-19 DIAGNOSIS — Z96.1: ICD-10-CM

## 2024-12-19 DIAGNOSIS — H35.013: ICD-10-CM

## 2024-12-19 PROCEDURE — 92014 COMPRE OPH EXAM EST PT 1/>: CPT | Performed by: OPHTHALMOLOGY

## 2024-12-19 PROCEDURE — 92250 FUNDUS PHOTOGRAPHY W/I&R: CPT | Performed by: OPHTHALMOLOGY

## 2024-12-19 ASSESSMENT — REFRACTION_CURRENTRX
OD_CYLINDER: SPH
OD_OVR_VA: 20/
OS_CYLINDER: +0.25
OS_OVR_VA: 20/
OD_AXIS: 090
OD_SPHERE: +2.25
OS_AXIS: 180
OS_SPHERE: +1.50
OD_AXIS: 090
OS_SPHERE: +2.00
OS_OVR_VA: 20/
OD_CYLINDER: SPH
OD_SPHERE: +2.00
OD_OVR_VA: 20/
OD_OVR_VA: 20/
OD_SPHERE: +1.50
OS_SPHERE: +2.00
OS_OVR_VA: 20/
OS_CYLINDER: SPH
OS_AXIS: 090

## 2024-12-19 ASSESSMENT — REFRACTION_AUTOREFRACTION
OD_SPHERE: -0.75
OS_AXIS: 151
OS_SPHERE: -0.25
OD_CYLINDER: +1.00
OD_AXIS: 057
OS_CYLINDER: +0.75

## 2024-12-19 ASSESSMENT — VISUAL ACUITY
OD_BCVA: 20/25
OS_BCVA: 20/25-1

## 2024-12-19 ASSESSMENT — CONFRONTATIONAL VISUAL FIELD TEST (CVF)
OD_FINDINGS: FULL
OS_FINDINGS: FULL

## 2025-01-16 ENCOUNTER — APPOINTMENT (OUTPATIENT)
Dept: GERIATRICS | Facility: CLINIC | Age: 89
End: 2025-01-16
Payer: MEDICARE

## 2025-01-16 VITALS
SYSTOLIC BLOOD PRESSURE: 130 MMHG | OXYGEN SATURATION: 95 % | WEIGHT: 159 LBS | DIASTOLIC BLOOD PRESSURE: 70 MMHG | TEMPERATURE: 98.8 F | HEART RATE: 100 BPM | BODY MASS INDEX: 33.37 KG/M2 | HEIGHT: 58 IN

## 2025-01-16 DIAGNOSIS — Z71.89 OTHER SPECIFIED COUNSELING: ICD-10-CM

## 2025-01-16 DIAGNOSIS — M25.562 PAIN IN LEFT KNEE: ICD-10-CM

## 2025-01-16 DIAGNOSIS — M81.0 AGE-RELATED OSTEOPOROSIS W/OUT CURRENT PATHOLOGICAL FRACTURE: ICD-10-CM

## 2025-01-16 DIAGNOSIS — M47.817 SPONDYLOSIS W/OUT MYELOPATHY OR RADICULOPATHY, LUMBOSACRAL REGION: ICD-10-CM

## 2025-01-16 DIAGNOSIS — R00.0 TACHYCARDIA, UNSPECIFIED: ICD-10-CM

## 2025-01-16 PROCEDURE — G2211 COMPLEX E/M VISIT ADD ON: CPT

## 2025-01-16 PROCEDURE — G0444 DEPRESSION SCREEN ANNUAL: CPT | Mod: 59

## 2025-01-16 PROCEDURE — 99205 OFFICE O/P NEW HI 60 MIN: CPT

## 2025-01-17 ENCOUNTER — APPOINTMENT (OUTPATIENT)
Dept: GERIATRICS | Facility: CLINIC | Age: 89
End: 2025-01-17

## 2025-01-17 LAB
ALBUMIN SERPL ELPH-MCNC: 4.3 G/DL
ALP BLD-CCNC: 63 U/L
ALT SERPL-CCNC: 12 U/L
ANION GAP SERPL CALC-SCNC: 13 MMOL/L
AST SERPL-CCNC: 17 U/L
BASOPHILS # BLD AUTO: 0.09 K/UL
BASOPHILS NFR BLD AUTO: 1.2 %
BILIRUB SERPL-MCNC: 0.4 MG/DL
BUN SERPL-MCNC: 17 MG/DL
CALCIUM SERPL-MCNC: 10 MG/DL
CHLORIDE SERPL-SCNC: 101 MMOL/L
CHOLEST SERPL-MCNC: 231 MG/DL
CO2 SERPL-SCNC: 25 MMOL/L
CREAT SERPL-MCNC: 0.94 MG/DL
CRP SERPL-MCNC: <3 MG/L
EGFR: 58 ML/MIN/1.73M2
EOSINOPHIL # BLD AUTO: 0.24 K/UL
EOSINOPHIL NFR BLD AUTO: 3.2 %
ERYTHROCYTE [SEDIMENTATION RATE] IN BLOOD BY WESTERGREN METHOD: 15 MM/HR
GLUCOSE SERPL-MCNC: 108 MG/DL
HCT VFR BLD CALC: 42.9 %
HDLC SERPL-MCNC: 64 MG/DL
HGB BLD-MCNC: 14.1 G/DL
IMM GRANULOCYTES NFR BLD AUTO: 0.4 %
LDLC SERPL CALC-MCNC: 119 MG/DL
LYMPHOCYTES # BLD AUTO: 1.84 K/UL
LYMPHOCYTES NFR BLD AUTO: 24.8 %
MAN DIFF?: NORMAL
MCHC RBC-ENTMCNC: 30.6 PG
MCHC RBC-ENTMCNC: 32.9 G/DL
MCV RBC AUTO: 93.1 FL
MONOCYTES # BLD AUTO: 0.69 K/UL
MONOCYTES NFR BLD AUTO: 9.3 %
NEUTROPHILS # BLD AUTO: 4.52 K/UL
NEUTROPHILS NFR BLD AUTO: 61.1 %
NONHDLC SERPL-MCNC: 167 MG/DL
PLATELET # BLD AUTO: 261 K/UL
POTASSIUM SERPL-SCNC: 4.5 MMOL/L
PROT SERPL-MCNC: 6.8 G/DL
RBC # BLD: 4.61 M/UL
RBC # FLD: 13.4 %
SODIUM SERPL-SCNC: 140 MMOL/L
TRIGL SERPL-MCNC: 278 MG/DL
TSH SERPL-ACNC: 2.26 UIU/ML
WBC # FLD AUTO: 7.41 K/UL

## 2025-01-23 ENCOUNTER — APPOINTMENT (OUTPATIENT)
Dept: ORTHOPEDIC SURGERY | Facility: CLINIC | Age: 89
End: 2025-01-23

## 2025-01-23 DIAGNOSIS — M48.061 SPINAL STENOSIS, LUMBAR REGION WITHOUT NEUROGENIC CLAUDICATION: ICD-10-CM

## 2025-01-31 ENCOUNTER — APPOINTMENT (OUTPATIENT)
Dept: ORTHOPEDIC SURGERY | Facility: CLINIC | Age: 89
End: 2025-01-31
Payer: MEDICARE

## 2025-01-31 DIAGNOSIS — G89.4 CHRONIC PAIN SYNDROME: ICD-10-CM

## 2025-01-31 PROBLEM — M11.262 PSEUDOGOUT OF LEFT KNEE: Status: ACTIVE | Noted: 2025-01-31

## 2025-01-31 PROCEDURE — 20610 DRAIN/INJ JOINT/BURSA W/O US: CPT | Mod: LT

## 2025-01-31 PROCEDURE — 73564 X-RAY EXAM KNEE 4 OR MORE: CPT | Mod: LT

## 2025-01-31 PROCEDURE — 99204 OFFICE O/P NEW MOD 45 MIN: CPT | Mod: 25

## 2025-01-31 RX ORDER — METHYLPRED ACET/NACL,ISO-OS/PF 80 MG/ML
80 VIAL (ML) INJECTION
Refills: 0 | Status: COMPLETED | OUTPATIENT
Start: 2025-01-31

## 2025-01-31 RX ADMIN — METHYLPREDNISOLONE ACETATE 0 MG/ML: 80 INJECTION, SUSPENSION INTRA-ARTICULAR; INTRALESIONAL; INTRAMUSCULAR; SOFT TISSUE at 00:00

## 2025-02-03 DIAGNOSIS — M15.9 POLYOSTEOARTHRITIS, UNSPECIFIED: ICD-10-CM

## 2025-02-03 DIAGNOSIS — M51.9 UNSPECIFIED THORACIC, THORACOLUMBAR AND LUMBOSACRAL INTERVERTEBRAL DISC DISORDER: ICD-10-CM

## 2025-02-03 DIAGNOSIS — Z87.39 PERSONAL HISTORY OF OTHER DISEASES OF THE MUSCULOSKELETAL SYSTEM AND CONNECTIVE TISSUE: ICD-10-CM

## 2025-02-03 DIAGNOSIS — Z86.19 PERSONAL HISTORY OF OTHER INFECTIOUS AND PARASITIC DISEASES: ICD-10-CM

## 2025-02-03 DIAGNOSIS — U07.1 COVID-19: ICD-10-CM

## 2025-02-04 DIAGNOSIS — R94.31 ABNORMAL ELECTROCARDIOGRAM [ECG] [EKG]: ICD-10-CM

## 2025-02-07 ENCOUNTER — APPOINTMENT (OUTPATIENT)
Dept: ORTHOPEDIC SURGERY | Facility: CLINIC | Age: 89
End: 2025-02-07

## 2025-02-07 DIAGNOSIS — M47.817 SPONDYLOSIS W/OUT MYELOPATHY OR RADICULOPATHY, LUMBOSACRAL REGION: ICD-10-CM

## 2025-02-07 DIAGNOSIS — M48.061 SPINAL STENOSIS, LUMBAR REGION WITHOUT NEUROGENIC CLAUDICATION: ICD-10-CM

## 2025-02-07 DIAGNOSIS — M25.562 PAIN IN LEFT KNEE: ICD-10-CM

## 2025-02-07 DIAGNOSIS — M11.262 OTHER CHONDROCALCINOSIS, LEFT KNEE: ICD-10-CM

## 2025-02-07 DIAGNOSIS — R53.1 WEAKNESS: ICD-10-CM

## 2025-02-07 PROCEDURE — 99213 OFFICE O/P EST LOW 20 MIN: CPT | Mod: 25

## 2025-02-07 PROCEDURE — 20610 DRAIN/INJ JOINT/BURSA W/O US: CPT | Mod: LT

## 2025-02-07 RX ORDER — DICLOFENAC SODIUM 10 MG/G
1 GEL TOPICAL
Qty: 100 | Refills: 0 | Status: ACTIVE | COMMUNITY
Start: 2025-02-07 | End: 1900-01-01

## 2025-02-07 RX ADMIN — Medication 0 MG/2.5ML: at 00:00

## 2025-02-26 ENCOUNTER — APPOINTMENT (OUTPATIENT)
Dept: CARDIOLOGY | Facility: CLINIC | Age: 89
End: 2025-02-26
Payer: MEDICARE

## 2025-02-26 VITALS
OXYGEN SATURATION: 98 % | SYSTOLIC BLOOD PRESSURE: 96 MMHG | WEIGHT: 159 LBS | HEIGHT: 58 IN | HEART RATE: 91 BPM | RESPIRATION RATE: 14 BRPM | DIASTOLIC BLOOD PRESSURE: 60 MMHG | BODY MASS INDEX: 33.37 KG/M2

## 2025-02-26 DIAGNOSIS — R94.31 ABNORMAL ELECTROCARDIOGRAM [ECG] [EKG]: ICD-10-CM

## 2025-02-26 DIAGNOSIS — Z78.9 OTHER SPECIFIED HEALTH STATUS: ICD-10-CM

## 2025-02-26 DIAGNOSIS — E78.5 HYPERLIPIDEMIA, UNSPECIFIED: ICD-10-CM

## 2025-02-26 PROCEDURE — 99204 OFFICE O/P NEW MOD 45 MIN: CPT

## 2025-02-27 PROBLEM — E78.5 HYPERLIPIDEMIA: Status: ACTIVE | Noted: 2025-02-27

## 2025-02-27 PROBLEM — Z78.9 DRINKS WINE: Status: ACTIVE | Noted: 2025-02-27

## 2025-03-12 ENCOUNTER — APPOINTMENT (OUTPATIENT)
Dept: GERIATRICS | Facility: CLINIC | Age: 89
End: 2025-03-12
Payer: MEDICARE

## 2025-03-12 VITALS
BODY MASS INDEX: 32.75 KG/M2 | TEMPERATURE: 97.9 F | SYSTOLIC BLOOD PRESSURE: 135 MMHG | HEART RATE: 82 BPM | DIASTOLIC BLOOD PRESSURE: 68 MMHG | OXYGEN SATURATION: 98 % | HEIGHT: 58 IN | WEIGHT: 156 LBS

## 2025-03-12 DIAGNOSIS — E78.5 HYPERLIPIDEMIA, UNSPECIFIED: ICD-10-CM

## 2025-03-12 DIAGNOSIS — Z71.89 OTHER SPECIFIED COUNSELING: ICD-10-CM

## 2025-03-12 DIAGNOSIS — Z01.89 ENCOUNTER FOR OTHER SPECIFIED SPECIAL EXAMINATIONS: ICD-10-CM

## 2025-03-12 DIAGNOSIS — R00.0 TACHYCARDIA, UNSPECIFIED: ICD-10-CM

## 2025-03-12 DIAGNOSIS — M81.0 AGE-RELATED OSTEOPOROSIS W/OUT CURRENT PATHOLOGICAL FRACTURE: ICD-10-CM

## 2025-03-12 PROCEDURE — 99497 ADVNCD CARE PLAN 30 MIN: CPT

## 2025-03-12 PROCEDURE — G0444 DEPRESSION SCREEN ANNUAL: CPT | Mod: 59

## 2025-03-12 PROCEDURE — G0439: CPT

## 2025-03-12 RX ORDER — MULTIVIT-MIN/IRON/FOLIC ACID/K 18-600-40
50 MCG CAPSULE ORAL
Qty: 90 | Refills: 3 | Status: ACTIVE | COMMUNITY
Start: 2025-03-12

## 2025-03-12 RX ORDER — LYSINE HCL 500 MG
600 TABLET ORAL
Qty: 180 | Refills: 3 | Status: ACTIVE | COMMUNITY
Start: 2025-03-12

## 2025-03-19 DIAGNOSIS — M48.061 SPINAL STENOSIS, LUMBAR REGION WITHOUT NEUROGENIC CLAUDICATION: ICD-10-CM

## 2025-03-19 DIAGNOSIS — M54.16 RADICULOPATHY, LUMBAR REGION: ICD-10-CM

## 2025-03-19 DIAGNOSIS — M11.262 OTHER CHONDROCALCINOSIS, LEFT KNEE: ICD-10-CM

## 2025-03-19 DIAGNOSIS — R53.1 WEAKNESS: ICD-10-CM

## 2025-03-19 DIAGNOSIS — M25.562 PAIN IN LEFT KNEE: ICD-10-CM

## 2025-03-20 ENCOUNTER — APPOINTMENT (OUTPATIENT)
Dept: ORTHOPEDIC SURGERY | Facility: CLINIC | Age: 89
End: 2025-03-20

## 2025-03-25 ENCOUNTER — APPOINTMENT (OUTPATIENT)
Dept: CARDIOLOGY | Facility: CLINIC | Age: 89
End: 2025-03-25
Payer: MEDICARE

## 2025-03-25 DIAGNOSIS — R94.31 ABNORMAL ELECTROCARDIOGRAM [ECG] [EKG]: ICD-10-CM

## 2025-03-25 PROCEDURE — 93306 TTE W/DOPPLER COMPLETE: CPT

## 2025-04-07 ENCOUNTER — NON-APPOINTMENT (OUTPATIENT)
Age: 89
End: 2025-04-07

## 2025-04-24 ENCOUNTER — APPOINTMENT (OUTPATIENT)
Dept: ORTHOPEDIC SURGERY | Facility: CLINIC | Age: 89
End: 2025-04-24

## 2025-04-29 ENCOUNTER — APPOINTMENT (OUTPATIENT)
Dept: ORTHOPEDIC SURGERY | Facility: CLINIC | Age: 89
End: 2025-04-29

## 2025-05-02 ENCOUNTER — APPOINTMENT (OUTPATIENT)
Dept: ORTHOPEDIC SURGERY | Facility: CLINIC | Age: 89
End: 2025-05-02

## 2025-05-03 ENCOUNTER — RESULT REVIEW (OUTPATIENT)
Age: 89
End: 2025-05-03

## 2025-09-11 ENCOUNTER — APPOINTMENT (OUTPATIENT)
Dept: GERIATRICS | Facility: CLINIC | Age: 89
End: 2025-09-11
Payer: MEDICARE

## 2025-09-11 VITALS
TEMPERATURE: 97.5 F | HEART RATE: 79 BPM | OXYGEN SATURATION: 98 % | BODY MASS INDEX: 33.37 KG/M2 | WEIGHT: 159 LBS | SYSTOLIC BLOOD PRESSURE: 122 MMHG | HEIGHT: 58 IN | DIASTOLIC BLOOD PRESSURE: 72 MMHG

## 2025-09-11 DIAGNOSIS — M48.061 SPINAL STENOSIS, LUMBAR REGION WITHOUT NEUROGENIC CLAUDICATION: ICD-10-CM

## 2025-09-11 DIAGNOSIS — L04.9 ACUTE LYMPHADENITIS, UNSPECIFIED: ICD-10-CM

## 2025-09-11 DIAGNOSIS — M70.52 OTHER BURSITIS OF KNEE, LEFT KNEE: ICD-10-CM

## 2025-09-11 DIAGNOSIS — M54.16 RADICULOPATHY, LUMBAR REGION: ICD-10-CM

## 2025-09-11 PROCEDURE — 99215 OFFICE O/P EST HI 40 MIN: CPT

## 2025-09-11 PROCEDURE — G2211 COMPLEX E/M VISIT ADD ON: CPT
